# Patient Record
Sex: MALE | Race: BLACK OR AFRICAN AMERICAN | NOT HISPANIC OR LATINO | Employment: UNEMPLOYED | URBAN - METROPOLITAN AREA
[De-identification: names, ages, dates, MRNs, and addresses within clinical notes are randomized per-mention and may not be internally consistent; named-entity substitution may affect disease eponyms.]

---

## 2021-07-26 ENCOUNTER — TELEPHONE (OUTPATIENT)
Dept: FAMILY MEDICINE | Facility: CLINIC | Age: 56
End: 2021-07-26

## 2021-07-26 ENCOUNTER — OFFICE VISIT (OUTPATIENT)
Dept: FAMILY MEDICINE | Facility: CLINIC | Age: 56
End: 2021-07-26
Payer: MEDICARE

## 2021-07-26 VITALS
BODY MASS INDEX: 34.39 KG/M2 | RESPIRATION RATE: 16 BRPM | TEMPERATURE: 97 F | HEART RATE: 60 BPM | OXYGEN SATURATION: 96 % | WEIGHT: 232.19 LBS | DIASTOLIC BLOOD PRESSURE: 98 MMHG | HEIGHT: 69 IN | SYSTOLIC BLOOD PRESSURE: 160 MMHG

## 2021-07-26 DIAGNOSIS — Z13.228 SCREENING FOR ENDOCRINE, NUTRITIONAL, METABOLIC AND IMMUNITY DISORDER: ICD-10-CM

## 2021-07-26 DIAGNOSIS — E78.5 HYPERLIPIDEMIA, UNSPECIFIED HYPERLIPIDEMIA TYPE: ICD-10-CM

## 2021-07-26 DIAGNOSIS — Z89.511 HISTORY OF RIGHT BELOW KNEE AMPUTATION: ICD-10-CM

## 2021-07-26 DIAGNOSIS — Z13.0 SCREENING FOR ENDOCRINE, NUTRITIONAL, METABOLIC AND IMMUNITY DISORDER: ICD-10-CM

## 2021-07-26 DIAGNOSIS — Z13.21 SCREENING FOR ENDOCRINE, NUTRITIONAL, METABOLIC AND IMMUNITY DISORDER: ICD-10-CM

## 2021-07-26 DIAGNOSIS — G54.6 PHANTOM PAIN AFTER AMPUTATION OF LOWER EXTREMITY: ICD-10-CM

## 2021-07-26 DIAGNOSIS — Z79.4 TYPE 2 DIABETES MELLITUS WITH DIABETIC MONONEUROPATHY, WITH LONG-TERM CURRENT USE OF INSULIN: ICD-10-CM

## 2021-07-26 DIAGNOSIS — I10 ESSENTIAL HYPERTENSION: ICD-10-CM

## 2021-07-26 DIAGNOSIS — Z76.89 ENCOUNTER TO ESTABLISH CARE: Primary | ICD-10-CM

## 2021-07-26 DIAGNOSIS — Z11.59 ENCOUNTER FOR SCREENING FOR OTHER VIRAL DISEASES: ICD-10-CM

## 2021-07-26 DIAGNOSIS — D64.9 ANEMIA, UNSPECIFIED TYPE: Primary | ICD-10-CM

## 2021-07-26 DIAGNOSIS — E11.41 TYPE 2 DIABETES MELLITUS WITH DIABETIC MONONEUROPATHY, WITH LONG-TERM CURRENT USE OF INSULIN: ICD-10-CM

## 2021-07-26 DIAGNOSIS — Z00.00 ROUTINE ADULT HEALTH MAINTENANCE: ICD-10-CM

## 2021-07-26 DIAGNOSIS — Z13.29 SCREENING FOR ENDOCRINE, NUTRITIONAL, METABOLIC AND IMMUNITY DISORDER: ICD-10-CM

## 2021-07-26 PROCEDURE — 99204 OFFICE O/P NEW MOD 45 MIN: CPT | Mod: S$GLB,,, | Performed by: OBSTETRICS & GYNECOLOGY

## 2021-07-26 PROCEDURE — 99204 PR OFFICE/OUTPT VISIT, NEW, LEVL IV, 45-59 MIN: ICD-10-PCS | Mod: S$GLB,,, | Performed by: OBSTETRICS & GYNECOLOGY

## 2021-07-26 RX ORDER — GABAPENTIN 600 MG/1
600 TABLET ORAL 3 TIMES DAILY
COMMUNITY
Start: 2021-05-07 | End: 2021-07-26 | Stop reason: SDUPTHER

## 2021-07-26 RX ORDER — NAPROXEN SODIUM 220 MG/1
81 TABLET, FILM COATED ORAL DAILY
Qty: 90 TABLET | Refills: 1 | Status: SHIPPED | OUTPATIENT
Start: 2021-07-26 | End: 2021-10-21 | Stop reason: SDUPTHER

## 2021-07-26 RX ORDER — ATORVASTATIN CALCIUM 80 MG/1
80 TABLET, FILM COATED ORAL NIGHTLY
COMMUNITY
Start: 2021-06-29 | End: 2021-07-26 | Stop reason: SDUPTHER

## 2021-07-26 RX ORDER — SITAGLIPTIN 50 MG/1
50 TABLET, FILM COATED ORAL DAILY
COMMUNITY
Start: 2021-06-29 | End: 2021-07-26 | Stop reason: SDUPTHER

## 2021-07-26 RX ORDER — HYDROCODONE BITARTRATE AND ACETAMINOPHEN 5; 325 MG/1; MG/1
1 TABLET ORAL EVERY 6 HOURS PRN
COMMUNITY
Start: 2021-06-29 | End: 2022-01-28

## 2021-07-26 RX ORDER — INSULIN DETEMIR 100 [IU]/ML
45 INJECTION, SOLUTION SUBCUTANEOUS NIGHTLY
Qty: 13.5 ML | Refills: 3 | Status: SHIPPED | OUTPATIENT
Start: 2021-07-26 | End: 2021-10-21 | Stop reason: SDUPTHER

## 2021-07-26 RX ORDER — NIFEDIPINE 30 MG/1
30 TABLET, FILM COATED, EXTENDED RELEASE ORAL DAILY
Qty: 90 TABLET | Refills: 1 | Status: SHIPPED | OUTPATIENT
Start: 2021-07-26 | End: 2021-10-21

## 2021-07-26 RX ORDER — LISINOPRIL 5 MG/1
5 TABLET ORAL DAILY
Qty: 90 TABLET | Refills: 3 | Status: SHIPPED | OUTPATIENT
Start: 2021-07-26 | End: 2021-10-21 | Stop reason: SDUPTHER

## 2021-07-26 RX ORDER — ATORVASTATIN CALCIUM 80 MG/1
80 TABLET, FILM COATED ORAL NIGHTLY
Qty: 90 TABLET | Refills: 1 | Status: SHIPPED | OUTPATIENT
Start: 2021-07-26 | End: 2021-10-21 | Stop reason: SDUPTHER

## 2021-07-26 RX ORDER — OFLOXACIN 3 MG/ML
1 SOLUTION/ DROPS OPHTHALMIC
COMMUNITY
End: 2021-09-16

## 2021-07-26 RX ORDER — INSULIN DETEMIR 100 [IU]/ML
35 INJECTION, SOLUTION SUBCUTANEOUS
COMMUNITY
Start: 2021-06-30 | End: 2021-07-26 | Stop reason: SDUPTHER

## 2021-07-26 RX ORDER — NIFEDIPINE 30 MG/1
30 TABLET, FILM COATED, EXTENDED RELEASE ORAL DAILY
COMMUNITY
Start: 2021-06-29 | End: 2021-07-26 | Stop reason: SDUPTHER

## 2021-07-26 RX ORDER — SITAGLIPTIN 50 MG/1
50 TABLET, FILM COATED ORAL DAILY
Qty: 90 TABLET | Refills: 1 | Status: SHIPPED | OUTPATIENT
Start: 2021-07-26 | End: 2021-09-16

## 2021-07-26 RX ORDER — GABAPENTIN 600 MG/1
600 TABLET ORAL 3 TIMES DAILY
Qty: 90 TABLET | Refills: 3 | Status: SHIPPED | OUTPATIENT
Start: 2021-07-26 | End: 2021-09-16

## 2021-07-26 RX ORDER — NAPROXEN SODIUM 220 MG/1
81 TABLET, FILM COATED ORAL DAILY
COMMUNITY
End: 2021-07-26 | Stop reason: SDUPTHER

## 2021-07-29 ENCOUNTER — TELEPHONE (OUTPATIENT)
Dept: FAMILY MEDICINE | Facility: CLINIC | Age: 56
End: 2021-07-29

## 2021-07-29 LAB
ABS NRBC COUNT: 0 X 10 3/UL (ref 0–0.01)
ABSOLUTE BASOPHIL: 0.04 X 10 3/UL (ref 0–0.22)
ABSOLUTE EOSINOPHIL: 0.37 X 10 3/UL (ref 0.04–0.54)
ABSOLUTE IMMATURE GRAN: 0.03 X 10 3/UL (ref 0–0.04)
ABSOLUTE LYMPHOCYTE: 1.74 X 10 3/UL (ref 0.86–4.75)
ABSOLUTE MONOCYTE: 0.43 X 10 3/UL (ref 0.22–1.08)
ALBUMIN SERPL-MCNC: 4.2 G/DL (ref 3.5–5.2)
ALBUMIN/GLOB SERPL ELPH: 1.4 {RATIO} (ref 1–2.7)
ALP ISOS SERPL LEV INH-CCNC: 180 U/L (ref 40–130)
ALT (SGPT): 23 U/L (ref 0–41)
ANION GAP SERPL CALC-SCNC: 10 MMOL/L (ref 8–17)
AST SERPL-CCNC: 16 U/L (ref 0–40)
BASOPHILS NFR BLD: 0.5 % (ref 0.2–1.2)
BILIRUBIN, TOTAL: 0.48 MG/DL (ref 0–1.2)
BUN/CREAT SERPL: 19.7 (ref 6–20)
CALCIUM SERPL-MCNC: 9.3 MG/DL (ref 8.6–10.2)
CARBON DIOXIDE, CO2: 25 MMOL/L (ref 22–29)
CHLORIDE: 104 MMOL/L (ref 98–107)
CHOLEST SERPL-MSCNC: 96 MG/DL (ref 100–200)
CREAT SERPL-MCNC: 1.71 MG/DL (ref 0.7–1.2)
EOSINOPHIL NFR BLD: 5.1 % (ref 0.7–7)
GFR ESTIMATION: 41.77
GLOBULIN: 3.1 G/DL (ref 1.5–4.5)
GLUCOSE: 181 MG/DL (ref 74–106)
HCT VFR BLD AUTO: 39.1 % (ref 42–52)
HCV IGG SERPL QL IA: NONREACTIVE
HDLC SERPL-MCNC: 44 MG/DL
HGB BLD-MCNC: 12.2 G/DL (ref 14–18)
HIV 1+2 AB+HIV1 P24 AG SERPL QL IA: NONREACTIVE
IMMATURE GRANULOCYTES: 0.4 % (ref 0–0.5)
IRON BINDING CAPACITY: 332 UG/DL (ref 262–472)
IRON SERPL-MCNC: 47 UG/DL (ref 59–158)
LDL/HDL RATIO: 0.9 (ref 1–3)
LDLC SERPL CALC-MCNC: 37.8 MG/DL (ref 0–100)
LYMPHOCYTES NFR BLD: 23.8 % (ref 19.3–53.1)
MCH RBC QN AUTO: 26.5 PG (ref 27–32)
MCHC RBC AUTO-ENTMCNC: 31.2 G/DL (ref 32–36)
MCV RBC AUTO: 85 FL (ref 80–94)
MONOCYTES NFR BLD: 5.9 % (ref 4.7–12.5)
NEUTROPHILS # BLD AUTO: 4.71 X 10 3/UL (ref 2.15–7.56)
NEUTROPHILS NFR BLD: 64.3 % (ref 34–71.1)
NUCLEATED RED BLOOD CELLS: 0 /100 WBC (ref 0–0.2)
PLATELET # BLD AUTO: 247 X 10 3/UL (ref 135–400)
POTASSIUM: 5 MMOL/L (ref 3.5–5.1)
PROT SNV-MCNC: 7.3 G/DL (ref 6.4–8.3)
RBC # BLD AUTO: 4.6 X 10 6/UL (ref 4.7–6.1)
RDW-SD: 41.1 FL (ref 37–54)
SODIUM: 139 MMOL/L (ref 136–145)
TRIGL SERPL-MCNC: 71 MG/DL (ref 0–150)
TSH SERPL DL<=0.005 MIU/L-ACNC: 1.15 UIU/ML (ref 0.27–4.2)
UIBC SERPL-MCNC: 285 UG/DL (ref 112–346)
UREA NITROGEN (BUN): 33.7 MG/DL (ref 6–20)
WBC # BLD: 7.32 X 10 3/UL (ref 4.3–10.8)

## 2021-08-05 ENCOUNTER — TELEPHONE (OUTPATIENT)
Dept: FAMILY MEDICINE | Facility: CLINIC | Age: 56
End: 2021-08-05

## 2021-08-16 ENCOUNTER — OFFICE VISIT (OUTPATIENT)
Dept: FAMILY MEDICINE | Facility: CLINIC | Age: 56
End: 2021-08-16
Payer: MEDICARE

## 2021-08-16 VITALS
DIASTOLIC BLOOD PRESSURE: 72 MMHG | OXYGEN SATURATION: 98 % | SYSTOLIC BLOOD PRESSURE: 115 MMHG | TEMPERATURE: 99 F | HEART RATE: 66 BPM | BODY MASS INDEX: 34.21 KG/M2 | HEIGHT: 69 IN | RESPIRATION RATE: 18 BRPM | WEIGHT: 231 LBS

## 2021-08-16 DIAGNOSIS — R79.89 ELEVATED SERUM CREATININE: Primary | ICD-10-CM

## 2021-08-16 DIAGNOSIS — D50.9 IRON DEFICIENCY ANEMIA, UNSPECIFIED IRON DEFICIENCY ANEMIA TYPE: ICD-10-CM

## 2021-08-16 DIAGNOSIS — G54.6 PHANTOM PAIN AFTER AMPUTATION OF LOWER EXTREMITY: ICD-10-CM

## 2021-08-16 DIAGNOSIS — M54.50 ACUTE BILATERAL LOW BACK PAIN WITHOUT SCIATICA: ICD-10-CM

## 2021-08-16 DIAGNOSIS — Z79.4 TYPE 2 DIABETES MELLITUS WITH DIABETIC MONONEUROPATHY, WITH LONG-TERM CURRENT USE OF INSULIN: ICD-10-CM

## 2021-08-16 DIAGNOSIS — I10 ESSENTIAL HYPERTENSION: ICD-10-CM

## 2021-08-16 DIAGNOSIS — M15.9 OSTEOARTHRITIS OF MULTIPLE JOINTS, UNSPECIFIED OSTEOARTHRITIS TYPE: ICD-10-CM

## 2021-08-16 DIAGNOSIS — E11.41 TYPE 2 DIABETES MELLITUS WITH DIABETIC MONONEUROPATHY, WITH LONG-TERM CURRENT USE OF INSULIN: ICD-10-CM

## 2021-08-16 LAB
ALBUMIN SERPL-MCNC: 4.1 G/DL (ref 3.5–5.2)
ALBUMIN/GLOB SERPL ELPH: 1.3 {RATIO} (ref 1–2.7)
ALP ISOS SERPL LEV INH-CCNC: 103 U/L (ref 40–130)
ALT (SGPT): 18 U/L (ref 0–41)
ANION GAP SERPL CALC-SCNC: 9 MMOL/L (ref 8–17)
AST SERPL-CCNC: 13 U/L (ref 0–40)
BILIRUBIN, TOTAL: 0.68 MG/DL (ref 0–1.2)
BUN/CREAT SERPL: 17.4 (ref 6–20)
CALCIUM SERPL-MCNC: 9.2 MG/DL (ref 8.6–10.2)
CARBON DIOXIDE, CO2: 26 MMOL/L (ref 22–29)
CHLORIDE: 102 MMOL/L (ref 98–107)
CREAT SERPL-MCNC: 2.13 MG/DL (ref 0.7–1.2)
GFR ESTIMATION: 32.42
GLOBULIN: 3.1 G/DL (ref 1.5–4.5)
GLUCOSE: 139 MG/DL (ref 74–106)
POTASSIUM: 5.4 MMOL/L (ref 3.5–5.1)
PROT SNV-MCNC: 7.2 G/DL (ref 6.4–8.3)
SODIUM: 137 MMOL/L (ref 136–145)
UREA NITROGEN (BUN): 37.1 MG/DL (ref 6–20)

## 2021-08-16 PROCEDURE — 99214 OFFICE O/P EST MOD 30 MIN: CPT | Mod: S$GLB,,, | Performed by: OBSTETRICS & GYNECOLOGY

## 2021-08-16 PROCEDURE — 99214 PR OFFICE/OUTPT VISIT, EST, LEVL IV, 30-39 MIN: ICD-10-PCS | Mod: S$GLB,,, | Performed by: OBSTETRICS & GYNECOLOGY

## 2021-08-16 RX ORDER — TAMSULOSIN HYDROCHLORIDE 0.4 MG/1
1 CAPSULE ORAL DAILY
COMMUNITY
Start: 2021-08-11 | End: 2021-09-16

## 2021-08-16 RX ORDER — FERROUS SULFATE 325(65) MG
325 TABLET ORAL 2 TIMES DAILY
Qty: 60 TABLET | Refills: 6 | Status: SHIPPED | OUTPATIENT
Start: 2021-08-16 | End: 2021-09-15

## 2021-08-16 RX ORDER — DICLOFENAC SODIUM 10 MG/G
2 GEL TOPICAL 4 TIMES DAILY
Qty: 100 G | Refills: 2 | Status: SHIPPED | OUTPATIENT
Start: 2021-08-16 | End: 2022-01-28

## 2021-08-17 ENCOUNTER — TELEPHONE (OUTPATIENT)
Dept: FAMILY MEDICINE | Facility: CLINIC | Age: 56
End: 2021-08-17

## 2021-08-18 ENCOUNTER — TELEPHONE (OUTPATIENT)
Dept: HEMATOLOGY/ONCOLOGY | Facility: CLINIC | Age: 56
End: 2021-08-18

## 2021-09-13 ENCOUNTER — TELEPHONE (OUTPATIENT)
Dept: FAMILY MEDICINE | Facility: CLINIC | Age: 56
End: 2021-09-13

## 2021-09-16 ENCOUNTER — OFFICE VISIT (OUTPATIENT)
Dept: FAMILY MEDICINE | Facility: CLINIC | Age: 56
End: 2021-09-16
Payer: MEDICARE

## 2021-09-16 VITALS
WEIGHT: 236.19 LBS | TEMPERATURE: 98 F | HEART RATE: 98 BPM | BODY MASS INDEX: 34.98 KG/M2 | HEIGHT: 69 IN | OXYGEN SATURATION: 99 % | SYSTOLIC BLOOD PRESSURE: 119 MMHG | DIASTOLIC BLOOD PRESSURE: 70 MMHG | RESPIRATION RATE: 16 BRPM

## 2021-09-16 DIAGNOSIS — Z79.4 TYPE 2 DIABETES MELLITUS WITH DIABETIC MONONEUROPATHY, WITH LONG-TERM CURRENT USE OF INSULIN: ICD-10-CM

## 2021-09-16 DIAGNOSIS — Z01.818 PRE-OPERATIVE EXAM: Primary | ICD-10-CM

## 2021-09-16 DIAGNOSIS — E11.41 TYPE 2 DIABETES MELLITUS WITH DIABETIC MONONEUROPATHY, WITH LONG-TERM CURRENT USE OF INSULIN: ICD-10-CM

## 2021-09-16 LAB
ABS NRBC COUNT: 0 X 10 3/UL (ref 0–0.01)
ABSOLUTE BASOPHIL: 0.05 X 10 3/UL (ref 0–0.22)
ABSOLUTE EOSINOPHIL: 0.27 X 10 3/UL (ref 0.04–0.54)
ABSOLUTE IMMATURE GRAN: 0.03 X 10 3/UL (ref 0–0.04)
ABSOLUTE LYMPHOCYTE: 2.24 X 10 3/UL (ref 0.86–4.75)
ABSOLUTE MONOCYTE: 0.37 X 10 3/UL (ref 0.22–1.08)
ALBUMIN SERPL-MCNC: 3.8 G/DL (ref 3.5–5.2)
ALBUMIN/GLOB SERPL ELPH: 1.4 {RATIO} (ref 1–2.7)
ALP ISOS SERPL LEV INH-CCNC: 124 U/L (ref 40–130)
ALT (SGPT): 13 U/L (ref 0–41)
ANION GAP SERPL CALC-SCNC: 8 MMOL/L (ref 8–17)
AST SERPL-CCNC: 10 U/L (ref 0–40)
BASOPHILS NFR BLD: 0.8 % (ref 0.2–1.2)
BILIRUBIN, TOTAL: 0.38 MG/DL (ref 0–1.2)
BUN/CREAT SERPL: 16 (ref 6–20)
CALCIUM SERPL-MCNC: 8.8 MG/DL (ref 8.6–10.2)
CARBON DIOXIDE, CO2: 26 MMOL/L (ref 22–29)
CHLORIDE: 106 MMOL/L (ref 98–107)
CREAT SERPL-MCNC: 2.26 MG/DL (ref 0.7–1.2)
EOSINOPHIL NFR BLD: 4.1 % (ref 0.7–7)
GFR ESTIMATION: 30.28
GLOBULIN: 2.8 G/DL (ref 1.5–4.5)
GLUCOSE: 223 MG/DL (ref 74–106)
HCT VFR BLD AUTO: 34.5 % (ref 42–52)
HGB BLD-MCNC: 10.4 G/DL (ref 14–18)
IMMATURE GRANULOCYTES: 0.5 % (ref 0–0.5)
LYMPHOCYTES NFR BLD: 34 % (ref 19.3–53.1)
MCH RBC QN AUTO: 25.5 PG (ref 27–32)
MCHC RBC AUTO-ENTMCNC: 30.1 G/DL (ref 32–36)
MCV RBC AUTO: 84.6 FL (ref 80–94)
MONOCYTES NFR BLD: 5.6 % (ref 4.7–12.5)
NEUTROPHILS # BLD AUTO: 3.62 X 10 3/UL (ref 2.15–7.56)
NEUTROPHILS NFR BLD: 55 % (ref 34–71.1)
NUCLEATED RED BLOOD CELLS: 0 /100 WBC (ref 0–0.2)
PLATELET # BLD AUTO: 282 X 10 3/UL (ref 135–400)
POTASSIUM: 5.7 MMOL/L (ref 3.5–5.1)
PROT SNV-MCNC: 6.6 G/DL (ref 6.4–8.3)
RBC # BLD AUTO: 4.08 X 10 6/UL (ref 4.7–6.1)
RDW-SD: 42.5 FL (ref 37–54)
SODIUM: 140 MMOL/L (ref 136–145)
UREA NITROGEN (BUN): 36.2 MG/DL (ref 6–20)
WBC # BLD: 6.58 X 10 3/UL (ref 4.3–10.8)

## 2021-09-16 PROCEDURE — 99214 PR OFFICE/OUTPT VISIT, EST, LEVL IV, 30-39 MIN: ICD-10-PCS | Mod: S$GLB,,, | Performed by: OBSTETRICS & GYNECOLOGY

## 2021-09-16 PROCEDURE — 99214 OFFICE O/P EST MOD 30 MIN: CPT | Mod: S$GLB,,, | Performed by: OBSTETRICS & GYNECOLOGY

## 2021-09-16 RX ORDER — GABAPENTIN 800 MG/1
800 TABLET ORAL 3 TIMES DAILY
Qty: 90 TABLET | Refills: 3 | Status: SHIPPED | OUTPATIENT
Start: 2021-09-16 | End: 2022-05-02

## 2021-09-20 ENCOUNTER — TELEPHONE (OUTPATIENT)
Dept: FAMILY MEDICINE | Facility: CLINIC | Age: 56
End: 2021-09-20

## 2021-10-21 ENCOUNTER — OFFICE VISIT (OUTPATIENT)
Dept: FAMILY MEDICINE | Facility: CLINIC | Age: 56
End: 2021-10-21
Payer: MEDICARE

## 2021-10-21 VITALS
WEIGHT: 228 LBS | RESPIRATION RATE: 16 BRPM | HEIGHT: 69 IN | OXYGEN SATURATION: 98 % | DIASTOLIC BLOOD PRESSURE: 80 MMHG | TEMPERATURE: 97 F | HEART RATE: 94 BPM | BODY MASS INDEX: 33.77 KG/M2 | SYSTOLIC BLOOD PRESSURE: 125 MMHG

## 2021-10-21 DIAGNOSIS — E11.41 TYPE 2 DIABETES MELLITUS WITH DIABETIC MONONEUROPATHY, WITH LONG-TERM CURRENT USE OF INSULIN: ICD-10-CM

## 2021-10-21 DIAGNOSIS — Z79.4 TYPE 2 DIABETES MELLITUS WITH DIABETIC MONONEUROPATHY, WITH LONG-TERM CURRENT USE OF INSULIN: ICD-10-CM

## 2021-10-21 DIAGNOSIS — E78.5 HYPERLIPIDEMIA, UNSPECIFIED HYPERLIPIDEMIA TYPE: ICD-10-CM

## 2021-10-21 DIAGNOSIS — I10 ESSENTIAL HYPERTENSION: ICD-10-CM

## 2021-10-21 PROCEDURE — 99214 PR OFFICE/OUTPT VISIT, EST, LEVL IV, 30-39 MIN: ICD-10-PCS | Mod: S$GLB,,, | Performed by: OBSTETRICS & GYNECOLOGY

## 2021-10-21 PROCEDURE — 99214 OFFICE O/P EST MOD 30 MIN: CPT | Mod: S$GLB,,, | Performed by: OBSTETRICS & GYNECOLOGY

## 2021-10-21 RX ORDER — LISINOPRIL 5 MG/1
5 TABLET ORAL DAILY
Qty: 90 TABLET | Refills: 1 | Status: SHIPPED | OUTPATIENT
Start: 2021-10-21 | End: 2022-01-28 | Stop reason: SDUPTHER

## 2021-10-21 RX ORDER — INSULIN DETEMIR 100 [IU]/ML
45 INJECTION, SOLUTION SUBCUTANEOUS NIGHTLY
Qty: 13.5 ML | Refills: 3 | Status: SHIPPED | OUTPATIENT
Start: 2021-10-21 | End: 2022-01-28 | Stop reason: SDUPTHER

## 2021-10-21 RX ORDER — TRAMADOL HYDROCHLORIDE 50 MG/1
50 TABLET ORAL EVERY 4 HOURS PRN
COMMUNITY
Start: 2021-09-30 | End: 2022-01-28

## 2021-10-21 RX ORDER — NAPROXEN SODIUM 220 MG/1
81 TABLET, FILM COATED ORAL DAILY
Qty: 90 TABLET | Refills: 1 | Status: SHIPPED | OUTPATIENT
Start: 2021-10-21 | End: 2022-05-23 | Stop reason: SDUPTHER

## 2021-10-21 RX ORDER — ATORVASTATIN CALCIUM 80 MG/1
80 TABLET, FILM COATED ORAL NIGHTLY
Qty: 90 TABLET | Refills: 1 | Status: SHIPPED | OUTPATIENT
Start: 2021-10-21 | End: 2022-01-28 | Stop reason: SDUPTHER

## 2021-11-29 ENCOUNTER — PATIENT OUTREACH (OUTPATIENT)
Dept: ADMINISTRATIVE | Facility: HOSPITAL | Age: 56
End: 2021-11-29
Payer: MEDICARE

## 2022-01-28 ENCOUNTER — OFFICE VISIT (OUTPATIENT)
Dept: FAMILY MEDICINE | Facility: CLINIC | Age: 57
End: 2022-01-28
Payer: MEDICARE

## 2022-01-28 VITALS
RESPIRATION RATE: 16 BRPM | DIASTOLIC BLOOD PRESSURE: 82 MMHG | OXYGEN SATURATION: 99 % | BODY MASS INDEX: 32.29 KG/M2 | HEART RATE: 50 BPM | WEIGHT: 218 LBS | HEIGHT: 69 IN | SYSTOLIC BLOOD PRESSURE: 144 MMHG

## 2022-01-28 DIAGNOSIS — E78.5 HYPERLIPIDEMIA, UNSPECIFIED HYPERLIPIDEMIA TYPE: ICD-10-CM

## 2022-01-28 DIAGNOSIS — J30.2 SEASONAL ALLERGIC RHINITIS, UNSPECIFIED TRIGGER: ICD-10-CM

## 2022-01-28 DIAGNOSIS — I10 ESSENTIAL HYPERTENSION: ICD-10-CM

## 2022-01-28 DIAGNOSIS — R03.0 ELEVATED BLOOD PRESSURE READING: ICD-10-CM

## 2022-01-28 DIAGNOSIS — Z79.4 TYPE 2 DIABETES MELLITUS WITH DIABETIC MONONEUROPATHY, WITH LONG-TERM CURRENT USE OF INSULIN: Primary | ICD-10-CM

## 2022-01-28 DIAGNOSIS — E11.41 TYPE 2 DIABETES MELLITUS WITH DIABETIC MONONEUROPATHY, WITH LONG-TERM CURRENT USE OF INSULIN: Primary | ICD-10-CM

## 2022-01-28 PROCEDURE — 99214 PR OFFICE/OUTPT VISIT, EST, LEVL IV, 30-39 MIN: ICD-10-PCS | Mod: S$GLB,,, | Performed by: OBSTETRICS & GYNECOLOGY

## 2022-01-28 PROCEDURE — 99214 OFFICE O/P EST MOD 30 MIN: CPT | Mod: S$GLB,,, | Performed by: OBSTETRICS & GYNECOLOGY

## 2022-01-28 RX ORDER — LISINOPRIL 5 MG/1
5 TABLET ORAL DAILY
Qty: 90 TABLET | Refills: 1 | Status: SHIPPED | OUTPATIENT
Start: 2022-01-28 | End: 2022-02-04

## 2022-01-28 RX ORDER — SEMAGLUTIDE 1.34 MG/ML
0.25 INJECTION, SOLUTION SUBCUTANEOUS
Qty: 1 PEN | Refills: 0 | Status: SHIPPED | OUTPATIENT
Start: 2022-01-28 | End: 2022-02-04 | Stop reason: SDUPTHER

## 2022-01-28 RX ORDER — CETIRIZINE HYDROCHLORIDE 10 MG/1
10 TABLET ORAL DAILY
Qty: 30 TABLET | Refills: 1 | Status: SHIPPED | OUTPATIENT
Start: 2022-01-28 | End: 2022-02-04 | Stop reason: SDUPTHER

## 2022-01-28 RX ORDER — ATORVASTATIN CALCIUM 80 MG/1
80 TABLET, FILM COATED ORAL NIGHTLY
Qty: 90 TABLET | Refills: 1 | Status: SHIPPED | OUTPATIENT
Start: 2022-01-28 | End: 2022-02-28

## 2022-01-28 RX ORDER — INSULIN DETEMIR 100 [IU]/ML
45 INJECTION, SOLUTION SUBCUTANEOUS NIGHTLY
Qty: 13.5 ML | Refills: 3 | Status: SHIPPED | OUTPATIENT
Start: 2022-01-28 | End: 2022-05-23 | Stop reason: SDUPTHER

## 2022-01-28 NOTE — PROGRESS NOTES
Subjective:   Patient ID: Tk Padron is a 56 y.o. male who presents for evaluation today.    Chief Complaint:  Follow-up (3 month. Patient states that he has started to have nosebleeds when he blows his nose. )    History of Present Illness  HPI   Diabetes Mellitus  Patient presents for follow up of diabetes.   Current sx: asymptomatic  Patient denies: foot ulcerations, hyperglycemia, hypoglycemia , increased appetite, nausea, paresthesia of the feet, polydipsia, polyuria, visual disturbances, vomiting and weight loss.  Glucose monitoring: does not perform self glucose checks  Medication compliance: has been compliant with the medicaiton regimen  Dietary compliance: Self-reports he is compliant most of the time with following an ADA diet.  Exercise compliance: Rates his exercise level as intermittently.    Problem-focused Diabetes health goals:     Screening / Status    Goal    Meeting Goal?     HgbA1C  8.9 (07/21/2021) Annually  Less than 8% No     Lipid Profile Last done:  : 07/26/2021 Annually Yes   Lab Results   Component Value Date    LDLCALC 37.8 07/26/2021    Annually   Less than 100 mg/dl Yes    Nephropathy Screening  No results found for: LABMICR  No results found for: PROTEINUA Annually No    Blood Pressure Control      BP: (!) 144/82 (01/28/22 1000) Less than 140/90 No    Dilated retinal exam  Date of last exam:       Most Recent Eye Exam Date: Not Found Annually Yes    Statin: Taking  Yes   ACE/ARB: Taking  Yes    Foot exam  Date of last exam:       : 09/16/2021 Annually Yes     No vision changes, polyuria, polydipsia, hyper/hypoglycemic episodes, SOB, chest pain.    Hyperlipidemia  Pt with a PMH of dyslipidemia.   Duration: chronic  Current treatment regimen: atorvastatin 80 mg   Compliance: High compliance  Complications: no known coronary artery disease, no known cerebral vascular disease, no known peripheral artery disease  ASCVD:The ASCVD Risk score (Arely JORGE Jr., et al., 2013) failed to  calculate for the following reasons:    The valid total cholesterol range is 130 to 320 mg/dL     The patient does use medications that may worsen dyslipidemias (corticosteroids, progestins, anabolic steroids, diuretics, beta-blockers, amiodarone, cyclosporine, olanzapine).   The patient exercises infrequently.    Cardiac Risk Factors  Age > 45-male, > 55-female:  YES  +1   Smoking:   NO   Sig. family hx of CHD*:  NO   Hypertension:   YES  +1   Diabetes:   YES  +1   HDL < 35:   NO   HDL > 59:   NO   Total: 3   *- Sig. family h/o CHD per NCEP = MI or sudden death at < 55 y.o. in father or other 1st-degree male relative, or <64yo in mother or other 1st-degree female relative    Lab Review  Lab Results   Component Value Date    CHOL 96 (L) 07/26/2021    HDL 44 (L) 07/26/2021     Hypertension  Patient is here for follow-up of hypertension.   Home blood pressure readings: not doing.    Diet type, salt intake: general   Use of agents associated with hypertension: none  (anticholinergics, amphetamines, anorectics, corticosteroids, decongestants, estrogens or OCP, NSAIDs, thyroid hormones, topical mineralocorticoids)  Associated signs and symptoms: none.   Patient specifically denies: blurred vision, chest pain, dyspnea, headache, neck aches, orthopnea, palpitations, paroxysmal nocturnal dyspnea, peripheral edema, pulsating in the ears and tiredness/fatigue.   Medication compliance: taking as prescribed.  History of target organ damage: chronic kidney disease.  Cardiovascular risk factors are diabetes mellitus, dyslipidemia, hypertension, male gender and obesity (BMI >= 30 kg/m2).   Family history is negative for hypertension, diabetes, or cardiovascular disease.    FAMILY HISTORY  History reviewed. No pertinent family history.  SOCIAL HISTORY  Social History     Tobacco Use   Smoking Status Never Smoker   Smokeless Tobacco Never Used   ,   Social History     Substance and Sexual Activity   Alcohol Use Never      MEDICATIONS  Outpatient Medications Marked as Taking for the 1/28/22 encounter (Office Visit) with Augustina Johansen NP   Medication Sig Dispense Refill    aspirin 81 MG Chew Take 1 tablet (81 mg total) by mouth once daily. 90 tablet 1    gabapentin (NEURONTIN) 800 MG tablet Take 1 tablet (800 mg total) by mouth 3 (three) times daily. 90 tablet 3    [DISCONTINUED] atorvastatin (LIPITOR) 80 MG tablet Take 1 tablet (80 mg total) by mouth nightly. 90 tablet 1    [DISCONTINUED] LEVEMIR FLEXTOUCH U-100 INSULN 100 unit/mL (3 mL) InPn pen Inject 45 Units into the skin every evening. 13.5 mL 3    [DISCONTINUED] lisinopriL (PRINIVIL,ZESTRIL) 5 MG tablet Take 1 tablet (5 mg total) by mouth once daily. 90 tablet 1    [DISCONTINUED] SITagliptin (JANUVIA) 100 MG Tab Take 1 tablet (100 mg total) by mouth once daily. 90 tablet 1     Review of Systems   Review of Systems   Constitutional: Negative for activity change, appetite change, fever and unexpected weight change.   HENT: Negative for dental problem, hearing loss, sneezing, sore throat, trouble swallowing and voice change.    Eyes: Negative for visual disturbance.   Respiratory: Negative for cough, choking, chest tightness and shortness of breath.    Cardiovascular: Negative for chest pain, palpitations, leg swelling and claudication.   Gastrointestinal: Negative for abdominal pain, blood in stool, change in bowel habit, nausea, vomiting, fecal incontinence and change in bowel habit.   Endocrine: Negative for polydipsia, polyphagia and polyuria.   Genitourinary: Negative for decreased urine volume, dysuria and hematuria.   Musculoskeletal: Negative for gait problem, neck pain and neck stiffness.   Integumentary:  Negative for color change, pallor, rash, wound and mole/lesion.   Allergic/Immunologic: Negative for frequent infections.   Neurological: Negative for dizziness, vertigo, seizures, syncope, speech difficulty, disturbances in coordination and coordination  "difficulties.   Hematological: Negative for adenopathy. Does not bruise/bleed easily.   Psychiatric/Behavioral: Negative for behavioral problems, confusion, hallucinations, self-injury, sleep disturbance and suicidal ideas.         Objective:    VITALS  BP Readings from Last 1 Encounters:   01/28/22 (!) 144/82   Weight: 98.9 kg (218 lb)   Height: 5' 9" (175.3 cm)   BMI Readings from Last 1 Encounters:   01/28/22 32.19 kg/m²       Physical Exam  Vitals and nursing note reviewed.   Constitutional:       General: He is not in acute distress.     Appearance: Normal appearance. He is not ill-appearing, toxic-appearing or diaphoretic.   HENT:      Head: Normocephalic and atraumatic.      Right Ear: External ear normal.      Left Ear: External ear normal.      Nose: Nose normal.   Eyes:      General:         Right eye: No discharge.         Left eye: No discharge.   Neck:      Vascular: No carotid bruit.   Cardiovascular:      Rate and Rhythm: Normal rate and regular rhythm.      Heart sounds: Normal heart sounds. No murmur heard.  No friction rub. No gallop.    Pulmonary:      Effort: Pulmonary effort is normal. No respiratory distress.      Breath sounds: Normal breath sounds. No stridor. No wheezing, rhonchi or rales.   Chest:      Chest wall: No tenderness.   Musculoskeletal:         General: Normal range of motion.      Cervical back: Normal range of motion and neck supple.      Right lower leg: No edema.      Left lower leg: No edema.   Skin:     General: Skin is warm and dry.      Coloration: Skin is not jaundiced or pale.   Neurological:      General: No focal deficit present.      Mental Status: He is alert and oriented to person, place, and time.      Gait: Gait normal.   Psychiatric:         Mood and Affect: Mood normal.         Behavior: Behavior normal.         Thought Content: Thought content normal.         Judgment: Judgment normal.         Assessment:      1. Type 2 diabetes mellitus with diabetic " mononeuropathy, with long-term current use of insulin    2. Hyperlipidemia, unspecified hyperlipidemia type    3. Elevated blood pressure reading    4. Essential hypertension    5. Seasonal allergic rhinitis, unspecified trigger       Plan:   Type 2 diabetes mellitus with diabetic mononeuropathy, with long-term current use of insulin  -     semaglutide (OZEMPIC) 0.25 mg or 0.5 mg(2 mg/1.5 mL) pen injector; Inject 0.25 mg into the skin every 7 days. for 7 days  Dispense: 1 pen; Refill: 0  -     lisinopriL (PRINIVIL,ZESTRIL) 5 MG tablet; Take 1 tablet (5 mg total) by mouth once daily.  Dispense: 90 tablet; Refill: 1  -     LEVEMIR FLEXTOUCH U-100 INSULN 100 unit/mL (3 mL) InPn pen; Inject 45 Units into the skin every evening.  Dispense: 13.5 mL; Refill: 3  -     Hemoglobin A1C  A1c 9.2% today   1.  Rx changes: Discontinue Januvia, begin Ozempic.  2.  Education: Reviewed ABCs of diabetes management (respective goals in parentheses):  A1C (<7), blood pressure (<130/80), and cholesterol (LDL <100).  3.  Compliance at present is estimated to be good. Efforts to improve compliance (if necessary) will be directed at increased exercise.  4. Follow up: 3 months  Pt states they are familiar with the signs and symptoms of hypoglycemia and how to treat.    Hyperlipidemia, unspecified hyperlipidemia type  -     atorvastatin (LIPITOR) 80 MG tablet; Take 1 tablet (80 mg total) by mouth nightly.  Dispense: 90 tablet; Refill: 1  Refills provided  Risks of elevated cholesterol include plaque formation, HTN, increased risk for stroke and MI. Emphasized need for low fat/cholesterol diet. Benefits of regular exercise routine consisting of 30 minutes of moderate activity 5x/weekly & adding an over-the-counter fish oil daily.     Elevated blood pressure reading  RTC 1 week for reassessment  If still increased at that time will plan to increase lisinopril     Essential hypertension  -     lisinopriL (PRINIVIL,ZESTRIL) 5 MG tablet; Take 1  tablet (5 mg total) by mouth once daily.  Dispense: 90 tablet; Refill: 1  Advised exercise and low salt diet high in vegetables, whole grains and low in meat/saturated fats. Reviewed importance of taking medication(s) daily. Side effects of medications reviewed. Monitor BP daily at home. Keep a log of values & bring to next appointment. Report any abnormalities to provider. Report to nearest ER with chest pain, shortness of breath, markedly increased BP, dyspnea, headache, visual disturbance, etc.      Seasonal allergic rhinitis, unspecified trigger  -     cetirizine (ZYRTEC) 10 MG tablet; Take 1 tablet (10 mg total) by mouth once daily.  Dispense: 30 tablet; Refill: 1    Patient instructed to contact the clinic should any questions or concerns arise prior to next office visit. Risks, benefits, and alternatives discussed with patient. Patient verbalized understanding of discussed plan of care. Asked patient if any further questions, answered no. Follow up as discussed or sooner PRN.

## 2022-02-04 ENCOUNTER — OFFICE VISIT (OUTPATIENT)
Dept: FAMILY MEDICINE | Facility: CLINIC | Age: 57
End: 2022-02-04
Payer: MEDICARE

## 2022-02-04 VITALS
HEIGHT: 69 IN | SYSTOLIC BLOOD PRESSURE: 146 MMHG | OXYGEN SATURATION: 98 % | TEMPERATURE: 98 F | WEIGHT: 222.81 LBS | DIASTOLIC BLOOD PRESSURE: 86 MMHG | RESPIRATION RATE: 16 BRPM | BODY MASS INDEX: 33 KG/M2 | HEART RATE: 96 BPM

## 2022-02-04 DIAGNOSIS — J30.2 SEASONAL ALLERGIC RHINITIS, UNSPECIFIED TRIGGER: ICD-10-CM

## 2022-02-04 DIAGNOSIS — Z79.4 TYPE 2 DIABETES MELLITUS WITH DIABETIC MONONEUROPATHY, WITH LONG-TERM CURRENT USE OF INSULIN: ICD-10-CM

## 2022-02-04 DIAGNOSIS — E11.41 TYPE 2 DIABETES MELLITUS WITH DIABETIC MONONEUROPATHY, WITH LONG-TERM CURRENT USE OF INSULIN: ICD-10-CM

## 2022-02-04 DIAGNOSIS — M15.9 OSTEOARTHRITIS OF MULTIPLE JOINTS, UNSPECIFIED OSTEOARTHRITIS TYPE: Primary | ICD-10-CM

## 2022-02-04 DIAGNOSIS — I10 ESSENTIAL HYPERTENSION: ICD-10-CM

## 2022-02-04 PROCEDURE — 99213 OFFICE O/P EST LOW 20 MIN: CPT | Mod: S$GLB,,, | Performed by: OBSTETRICS & GYNECOLOGY

## 2022-02-04 PROCEDURE — 99213 PR OFFICE/OUTPT VISIT, EST, LEVL III, 20-29 MIN: ICD-10-PCS | Mod: S$GLB,,, | Performed by: OBSTETRICS & GYNECOLOGY

## 2022-02-04 RX ORDER — LISINOPRIL 10 MG/1
10 TABLET ORAL DAILY
Qty: 30 TABLET | Refills: 3 | Status: SHIPPED | OUTPATIENT
Start: 2022-02-04 | End: 2022-05-23 | Stop reason: SDUPTHER

## 2022-02-04 RX ORDER — SEMAGLUTIDE 1.34 MG/ML
0.25 INJECTION, SOLUTION SUBCUTANEOUS
Qty: 1 PEN | Refills: 0 | Status: SHIPPED | OUTPATIENT
Start: 2022-02-04 | End: 2022-02-04

## 2022-02-04 RX ORDER — DICLOFENAC SODIUM 10 MG/G
2 GEL TOPICAL 4 TIMES DAILY
Qty: 50 G | Refills: 6 | Status: SHIPPED | OUTPATIENT
Start: 2022-02-04 | End: 2022-05-23 | Stop reason: SDUPTHER

## 2022-02-04 RX ORDER — DICLOFENAC SODIUM 10 MG/G
2 GEL TOPICAL 4 TIMES DAILY
Qty: 50 G | Refills: 6 | Status: SHIPPED | OUTPATIENT
Start: 2022-02-04 | End: 2022-02-04

## 2022-02-04 RX ORDER — CETIRIZINE HYDROCHLORIDE 10 MG/1
10 TABLET ORAL DAILY
Qty: 30 TABLET | Refills: 1 | Status: SHIPPED | OUTPATIENT
Start: 2022-02-04 | End: 2022-02-04

## 2022-02-04 RX ORDER — SEMAGLUTIDE 1.34 MG/ML
0.25 INJECTION, SOLUTION SUBCUTANEOUS
Qty: 1 PEN | Refills: 0 | Status: SHIPPED | OUTPATIENT
Start: 2022-02-04 | End: 2022-02-11

## 2022-02-04 RX ORDER — LISINOPRIL 10 MG/1
10 TABLET ORAL DAILY
Qty: 30 TABLET | Refills: 3 | Status: SHIPPED | OUTPATIENT
Start: 2022-02-04 | End: 2022-02-04

## 2022-02-04 RX ORDER — CETIRIZINE HYDROCHLORIDE 10 MG/1
10 TABLET ORAL DAILY
Qty: 30 TABLET | Refills: 1 | Status: SHIPPED | OUTPATIENT
Start: 2022-02-04 | End: 2022-05-23 | Stop reason: SDUPTHER

## 2022-02-04 NOTE — PROGRESS NOTES
Subjective:   Patient ID: Tk Padron is a 56 y.o. male who presents for evaluation today.    Chief Complaint:  Follow-up (Pt is here for a follow up on his bp. Pt is also request more of the cream for arthritis pain)      History of Present Illness  HPI   Hypertension  Patient presents to the clinic today for a blood pressure follow up. At the last office visit, an elevated BP reading was noted.  Home blood pressure readings: not doing.    Diet type, salt intake: general   Use of agents associated with hypertension: none  (anticholinergics, amphetamines, anorectics, corticosteroids, decongestants, estrogens or OCP, NSAIDs, thyroid hormones, topical mineralocorticoids)  Associated signs and symptoms: none.   Patient specifically denies: blurred vision, chest pain, dyspnea, headache, neck aches, orthopnea, palpitations, paroxysmal nocturnal dyspnea, peripheral edema, pulsating in the ears and tiredness/fatigue.   Medication compliance: taking as prescribed.    FAMILY HISTORY  No family history on file.  SOCIAL HISTORY  Social History     Tobacco Use   Smoking Status Never Smoker   Smokeless Tobacco Never Used   ,   Social History     Substance and Sexual Activity   Alcohol Use Never     MEDICATIONS  Outpatient Medications Marked as Taking for the 2/4/22 encounter (Office Visit) with Augustina Johansen NP   Medication Sig Dispense Refill    aspirin 81 MG Chew Take 1 tablet (81 mg total) by mouth once daily. 90 tablet 1    atorvastatin (LIPITOR) 80 MG tablet Take 1 tablet (80 mg total) by mouth nightly. 90 tablet 1    gabapentin (NEURONTIN) 800 MG tablet Take 1 tablet (800 mg total) by mouth 3 (three) times daily. 90 tablet 3    LEVEMIR FLEXTOUCH U-100 INSULN 100 unit/mL (3 mL) InPn pen Inject 45 Units into the skin every evening. 13.5 mL 3    [DISCONTINUED] cetirizine (ZYRTEC) 10 MG tablet Take 1 tablet (10 mg total) by mouth once daily. 30 tablet 1    [DISCONTINUED] lisinopriL (PRINIVIL,ZESTRIL) 5 MG tablet  "Take 1 tablet (5 mg total) by mouth once daily. 90 tablet 1    [DISCONTINUED] semaglutide (OZEMPIC) 0.25 mg or 0.5 mg(2 mg/1.5 mL) pen injector Inject 0.25 mg into the skin every 7 days. for 7 days 1 pen 0     Review of Systems   Review of Systems   Constitutional: Negative for activity change, appetite change, fever and unexpected weight change.   HENT: Negative for dental problem, hearing loss, sneezing, sore throat, trouble swallowing and voice change.    Eyes: Negative for visual disturbance.   Respiratory: Negative for cough, choking, chest tightness and shortness of breath.    Cardiovascular: Negative for chest pain, palpitations, leg swelling and claudication.   Gastrointestinal: Negative for abdominal pain, blood in stool, change in bowel habit, nausea, vomiting, fecal incontinence and change in bowel habit.   Endocrine: Negative for polydipsia, polyphagia and polyuria.   Genitourinary: Negative for decreased urine volume, dysuria and hematuria.   Musculoskeletal: Negative for gait problem, neck pain and neck stiffness.   Integumentary:  Negative for color change, pallor, rash, wound and mole/lesion.   Allergic/Immunologic: Negative for frequent infections.   Neurological: Negative for dizziness, vertigo, seizures, syncope, speech difficulty, disturbances in coordination and coordination difficulties.   Hematological: Negative for adenopathy. Does not bruise/bleed easily.   Psychiatric/Behavioral: Negative for behavioral problems, confusion, hallucinations, self-injury, sleep disturbance and suicidal ideas.         Objective:    VITALS  BP Readings from Last 1 Encounters:   02/04/22 (!) 146/86   Weight: 101.1 kg (222 lb 12.8 oz)   Height: 5' 9" (175.3 cm)   BMI Readings from Last 1 Encounters:   02/04/22 32.90 kg/m²       Physical Exam  Vitals and nursing note reviewed.   Constitutional:       General: He is not in acute distress.     Appearance: Normal appearance. He is not ill-appearing, toxic-appearing " or diaphoretic.   HENT:      Head: Normocephalic and atraumatic.      Right Ear: External ear normal.      Left Ear: External ear normal.      Nose: Nose normal.   Eyes:      General:         Right eye: No discharge.         Left eye: No discharge.   Neck:      Vascular: No carotid bruit.   Cardiovascular:      Rate and Rhythm: Normal rate and regular rhythm.      Heart sounds: Normal heart sounds. No murmur heard.  No friction rub. No gallop.    Pulmonary:      Effort: Pulmonary effort is normal. No respiratory distress.      Breath sounds: Normal breath sounds. No stridor. No wheezing, rhonchi or rales.   Chest:      Chest wall: No tenderness.   Musculoskeletal:         General: Normal range of motion.      Cervical back: Normal range of motion and neck supple.      Right lower leg: No edema.      Left lower leg: No edema.   Skin:     General: Skin is warm and dry.      Coloration: Skin is not jaundiced or pale.   Neurological:      General: No focal deficit present.      Mental Status: He is alert and oriented to person, place, and time.      Gait: Gait normal.   Psychiatric:         Mood and Affect: Mood normal.         Behavior: Behavior normal.         Thought Content: Thought content normal.         Judgment: Judgment normal.         Assessment:      1. Osteoarthritis of multiple joints, unspecified osteoarthritis type    2. Type 2 diabetes mellitus with diabetic mononeuropathy, with long-term current use of insulin    3. Seasonal allergic rhinitis, unspecified trigger    4. Essential hypertension       Plan:   Osteoarthritis of multiple joints, unspecified osteoarthritis type  -     Discontinue: diclofenac sodium (ARTHRITIS PAIN, DICLOFENAC,) 1 % Gel; Apply 2 g topically 4 (four) times daily.  Dispense: 50 g; Refill: 6  -     diclofenac sodium (ARTHRITIS PAIN, DICLOFENAC,) 1 % Gel; Apply 2 g topically 4 (four) times daily.  Dispense: 50 g; Refill: 6    Type 2 diabetes mellitus with diabetic mononeuropathy,  with long-term current use of insulin  -     Discontinue: semaglutide (OZEMPIC) 0.25 mg or 0.5 mg(2 mg/1.5 mL) pen injector; Inject 0.25 mg into the skin every 7 days. for 7 days  Dispense: 1 pen; Refill: 0  -     Discontinue: lisinopriL 10 MG tablet; Take 1 tablet (10 mg total) by mouth once daily.  Dispense: 30 tablet; Refill: 3  -     lisinopriL 10 MG tablet; Take 1 tablet (10 mg total) by mouth once daily.  Dispense: 30 tablet; Refill: 3  -     semaglutide (OZEMPIC) 0.25 mg or 0.5 mg(2 mg/1.5 mL) pen injector; Inject 0.25 mg into the skin every 7 days. for 7 days  Dispense: 1 pen; Refill: 0    Seasonal allergic rhinitis, unspecified trigger  -     Discontinue: cetirizine (ZYRTEC) 10 MG tablet; Take 1 tablet (10 mg total) by mouth once daily.  Dispense: 30 tablet; Refill: 1  -     cetirizine (ZYRTEC) 10 MG tablet; Take 1 tablet (10 mg total) by mouth once daily.  Dispense: 30 tablet; Refill: 1    Essential hypertension  -     Discontinue: lisinopriL 10 MG tablet; Take 1 tablet (10 mg total) by mouth once daily.  Dispense: 30 tablet; Refill: 3  -     lisinopriL 10 MG tablet; Take 1 tablet (10 mg total) by mouth once daily.  Dispense: 30 tablet; Refill: 3    Increase lisinopril to 10 mg. Advised exercise and low salt diet high in vegetables, whole grains and low in meat/saturated fats. Reviewed importance of taking medication(s) daily. Side effects of medications reviewed. Monitor BP daily at home. Keep a log of values & bring to next appointment. Report any abnormalities to provider. Report to nearest ER with chest pain, shortness of breath, markedly increased BP, dyspnea, headache, visual disturbance, etc.  F/u 4 weeks.     Patient instructed to contact the clinic should any questions or concerns arise prior to next office visit. Risks, benefits, and alternatives discussed with patient. Patient verbalized understanding of discussed plan of care. Asked patient if any further questions, answered no. Follow up as  discussed or sooner PRN.

## 2022-05-19 ENCOUNTER — TELEPHONE (OUTPATIENT)
Dept: FAMILY MEDICINE | Facility: CLINIC | Age: 57
End: 2022-05-19
Payer: MEDICARE

## 2022-05-19 NOTE — TELEPHONE ENCOUNTER
----- Message from Augustina Johansen NP sent at 5/19/2022  8:36 AM CDT -----  Regarding: please call   Patient due for repeat labs and office visit

## 2022-05-23 ENCOUNTER — TELEPHONE (OUTPATIENT)
Dept: FAMILY MEDICINE | Facility: CLINIC | Age: 57
End: 2022-05-23

## 2022-05-23 ENCOUNTER — OFFICE VISIT (OUTPATIENT)
Dept: FAMILY MEDICINE | Facility: CLINIC | Age: 57
End: 2022-05-23
Payer: MEDICARE

## 2022-05-23 VITALS
TEMPERATURE: 97 F | DIASTOLIC BLOOD PRESSURE: 74 MMHG | BODY MASS INDEX: 31.55 KG/M2 | OXYGEN SATURATION: 98 % | SYSTOLIC BLOOD PRESSURE: 116 MMHG | WEIGHT: 213 LBS | HEART RATE: 62 BPM | HEIGHT: 69 IN | RESPIRATION RATE: 16 BRPM

## 2022-05-23 DIAGNOSIS — E11.41 TYPE 2 DIABETES MELLITUS WITH DIABETIC MONONEUROPATHY, WITH LONG-TERM CURRENT USE OF INSULIN: Primary | ICD-10-CM

## 2022-05-23 DIAGNOSIS — I10 ESSENTIAL HYPERTENSION: ICD-10-CM

## 2022-05-23 DIAGNOSIS — J30.2 SEASONAL ALLERGIC RHINITIS, UNSPECIFIED TRIGGER: ICD-10-CM

## 2022-05-23 DIAGNOSIS — M15.9 OSTEOARTHRITIS OF MULTIPLE JOINTS, UNSPECIFIED OSTEOARTHRITIS TYPE: ICD-10-CM

## 2022-05-23 DIAGNOSIS — Z79.4 TYPE 2 DIABETES MELLITUS WITH DIABETIC MONONEUROPATHY, WITH LONG-TERM CURRENT USE OF INSULIN: Primary | ICD-10-CM

## 2022-05-23 DIAGNOSIS — Z12.11 COLON CANCER SCREENING: ICD-10-CM

## 2022-05-23 DIAGNOSIS — E78.5 HYPERLIPIDEMIA, UNSPECIFIED HYPERLIPIDEMIA TYPE: ICD-10-CM

## 2022-05-23 LAB
A1C: NORMAL
ABS NRBC COUNT: 0 X 10 3/UL (ref 0–0.01)
ABSOLUTE BASOPHIL: 0.03 X 10 3/UL (ref 0–0.22)
ABSOLUTE EOSINOPHIL: 0.28 X 10 3/UL (ref 0.04–0.54)
ABSOLUTE IMMATURE GRAN: 0.04 X 10 3/UL (ref 0–0.04)
ABSOLUTE LYMPHOCYTE: 2.24 X 10 3/UL (ref 0.86–4.75)
ABSOLUTE MONOCYTE: 0.34 X 10 3/UL (ref 0.22–1.08)
ALBUMIN SERPL-MCNC: 4 G/DL (ref 3.5–5.2)
ALBUMIN/GLOB SERPL ELPH: 1.4 {RATIO} (ref 1–2.7)
ALP ISOS SERPL LEV INH-CCNC: 116 U/L (ref 40–130)
ALT (SGPT): 12 U/L (ref 0–41)
ANION GAP SERPL CALC-SCNC: 9 MMOL/L (ref 8–17)
AST SERPL-CCNC: 14 U/L (ref 0–40)
BASOPHILS NFR BLD: 0.4 % (ref 0.2–1.2)
BILIRUBIN, TOTAL: 0.41 MG/DL (ref 0–1.2)
BUN/CREAT SERPL: 19.1 (ref 6–20)
CALCIUM SERPL-MCNC: 9.2 MG/DL (ref 8.6–10.2)
CARBON DIOXIDE, CO2: 27 MMOL/L (ref 22–29)
CHLORIDE: 103 MMOL/L (ref 98–107)
CHOLEST SERPL-MSCNC: 169 MG/DL (ref 100–200)
CREAT SERPL-MCNC: 1.53 MG/DL (ref 0.7–1.2)
CREATININE RANDOM URINE: 124 MG/DL (ref 39–259)
EOSINOPHIL NFR BLD: 4 % (ref 0.7–7)
ESTIMATED AVERAGE GLUCOSE: 189 MG/DL
GFR ESTIMATION: 47.32
GLOBULIN: 2.8 G/DL (ref 1.5–4.5)
GLUCOSE: 201 MG/DL (ref 74–106)
HBA1C MFR BLD: 8.2 % (ref 4–6)
HCT VFR BLD AUTO: 39.3 % (ref 42–52)
HDLC SERPL-MCNC: 46 MG/DL
HGB BLD-MCNC: 12.3 G/DL (ref 14–18)
IMMATURE GRANULOCYTES: 0.6 % (ref 0–0.5)
LDL/HDL RATIO: 2.2 (ref 1–3)
LDLC SERPL CALC-MCNC: 99.4 MG/DL (ref 0–100)
LYMPHOCYTES NFR BLD: 32.3 % (ref 19.3–53.1)
MCH RBC QN AUTO: 26.7 PG (ref 27–32)
MCHC RBC AUTO-ENTMCNC: 31.3 G/DL (ref 32–36)
MCV RBC AUTO: 85.4 FL (ref 80–94)
MICROALBUMIN QUANT: 4.8 MG/DL (ref 0–2)
MICROALBUMIN/CREATININE RATIO: 38.71 UG/MG (ref 0–30)
MONOCYTES NFR BLD: 4.9 % (ref 4.7–12.5)
NEUTROPHILS # BLD AUTO: 4.01 X 10 3/UL (ref 2.15–7.56)
NEUTROPHILS NFR BLD: 57.8 % (ref 34–71.1)
NUCLEATED RED BLOOD CELLS: 0 /100 WBC (ref 0–0.2)
PLATELET # BLD AUTO: 231 X 10 3/UL (ref 135–400)
POTASSIUM: 5.1 MMOL/L (ref 3.5–5.1)
PROT SNV-MCNC: 6.8 G/DL (ref 6.4–8.3)
RBC # BLD AUTO: 4.6 X 10 6/UL (ref 4.7–6.1)
RDW-SD: 44.8 FL (ref 37–54)
SODIUM: 139 MMOL/L (ref 136–145)
TRIGL SERPL-MCNC: 118 MG/DL (ref 0–150)
TSH SERPL DL<=0.005 MIU/L-ACNC: 0.84 UIU/ML (ref 0.27–4.2)
UREA NITROGEN (BUN): 29.2 MG/DL (ref 6–20)
WBC # BLD: 6.94 X 10 3/UL (ref 4.3–10.8)

## 2022-05-23 PROCEDURE — 99214 PR OFFICE/OUTPT VISIT, EST, LEVL IV, 30-39 MIN: ICD-10-PCS | Mod: S$GLB,,, | Performed by: OBSTETRICS & GYNECOLOGY

## 2022-05-23 PROCEDURE — 83036 HEMOGLOBIN A1C: ICD-10-PCS | Mod: QW,,, | Performed by: OBSTETRICS & GYNECOLOGY

## 2022-05-23 PROCEDURE — 99214 OFFICE O/P EST MOD 30 MIN: CPT | Mod: S$GLB,,, | Performed by: OBSTETRICS & GYNECOLOGY

## 2022-05-23 PROCEDURE — 83036 HEMOGLOBIN GLYCOSYLATED A1C: CPT | Mod: QW,,, | Performed by: OBSTETRICS & GYNECOLOGY

## 2022-05-23 RX ORDER — NAPROXEN SODIUM 220 MG/1
81 TABLET, FILM COATED ORAL DAILY
Qty: 90 TABLET | Refills: 1 | Status: SHIPPED | OUTPATIENT
Start: 2022-05-23 | End: 2022-05-26

## 2022-05-23 RX ORDER — LISINOPRIL 10 MG/1
10 TABLET ORAL DAILY
Qty: 90 TABLET | Refills: 1 | Status: SHIPPED | OUTPATIENT
Start: 2022-05-23 | End: 2022-12-06

## 2022-05-23 RX ORDER — ATORVASTATIN CALCIUM 80 MG/1
TABLET, FILM COATED ORAL
Qty: 90 TABLET | Refills: 1 | Status: SHIPPED | OUTPATIENT
Start: 2022-05-23

## 2022-05-23 RX ORDER — SITAGLIPTIN 50 MG/1
50 TABLET, FILM COATED ORAL DAILY
Qty: 90 TABLET | Refills: 1 | Status: SHIPPED | OUTPATIENT
Start: 2022-05-23

## 2022-05-23 RX ORDER — GABAPENTIN 800 MG/1
800 TABLET ORAL 3 TIMES DAILY
Qty: 90 TABLET | Refills: 3 | Status: SHIPPED | OUTPATIENT
Start: 2022-05-23

## 2022-05-23 RX ORDER — LISINOPRIL 10 MG/1
10 TABLET ORAL DAILY
Qty: 90 TABLET | Refills: 1 | Status: SHIPPED | OUTPATIENT
Start: 2022-05-23 | End: 2022-05-23

## 2022-05-23 RX ORDER — CETIRIZINE HYDROCHLORIDE 10 MG/1
10 TABLET ORAL DAILY
Qty: 30 TABLET | Refills: 1 | Status: SHIPPED | OUTPATIENT
Start: 2022-05-23 | End: 2022-05-23

## 2022-05-23 RX ORDER — DICLOFENAC SODIUM 10 MG/G
2 GEL TOPICAL 4 TIMES DAILY
Qty: 50 G | Refills: 6 | Status: SHIPPED | OUTPATIENT
Start: 2022-05-23

## 2022-05-23 RX ORDER — CETIRIZINE HYDROCHLORIDE 10 MG/1
10 TABLET ORAL DAILY
Qty: 90 TABLET | Refills: 1 | Status: SHIPPED | OUTPATIENT
Start: 2022-05-23

## 2022-05-23 RX ORDER — INSULIN DETEMIR 100 [IU]/ML
45 INJECTION, SOLUTION SUBCUTANEOUS NIGHTLY
Qty: 13.5 ML | Refills: 3 | Status: SHIPPED | OUTPATIENT
Start: 2022-05-23 | End: 2022-06-22

## 2022-05-23 NOTE — PROGRESS NOTES
Subjective:   Patient ID: Tk Padron is a 57 y.o. male who presents for evaluation today.    Chief Complaint:  Follow-up (Patient needs refills on medication )    History of Present Illness  HPI   Health maintenance  Due for colonoscopy     Hypertension  Patient is here for follow-up of hypertension.   He is exercising and is adherent to a low-salt diet. Home blood pressure readings: not doing.    Diet type, salt intake: low sodium   Use of agents associated with hypertension: none  (anticholinergics, amphetamines, anorectics, corticosteroids, decongestants, estrogens or OCP, NSAIDs, thyroid hormones, topical mineralocorticoids)  Associated signs and symptoms: none.   Patient specifically denies: blurred vision, chest pain, dyspnea, headache, neck aches, orthopnea, palpitations, paroxysmal nocturnal dyspnea, peripheral edema, pulsating in the ears and tiredness/fatigue.   Medication compliance: taking as prescribed.  History of target organ damage: chronic kidney disease.  Cardiovascular risk factors are advanced age (older than 55 for men, 65 for women), diabetes mellitus, dyslipidemia, hypertension, male gender and obesity (BMI >= 30 kg/m2).   Family history is negative for hypertension, diabetes, or cardiovascular disease.     Diabetes Mellitus  Patient presents for follow up of diabetes.   Current sx: asymptomatic  Patient denies: foot ulcerations, hyperglycemia, hypoglycemia , increased appetite, nausea, paresthesia of the feet, polydipsia, polyuria, visual disturbances, vomiting and weight loss.  Glucose monitoring: checks daily  Medication compliance: has been compliant with the medicaiton regimen  Dietary compliance: Self-reports he is compliant most of the time with following an ADA diet.  Exercise compliance: Rates his exercise level as intermittently.    Problem-focused Diabetes health goals:     Screening / Status    Goal    Meeting Goal?     HgbA1C  8.9 (07/21/2021) Annually  Less than 8% Yes      Lipid Profile Last done:  : 07/26/2021 Annually Yes   Lab Results   Component Value Date    LDLCALC 37.8 07/26/2021    Annually   Less than 100 mg/dl Yes    Nephropathy Screening  No results found for: LABMICR  No results found for: PROTEINUA Annually No    Blood Pressure Control      BP: 116/74 (05/23/22 0927) Less than 140/90 Yes    Dilated retinal exam  Date of last exam:       Most Recent Eye Exam Date: Not Found Annually Yes    Statin: Taking  Yes   ACE/ARB: Taking  Yes    Foot exam  Date of last exam:       : 09/16/2021 Annually Yes   No vision changes, polyuria, polydipsia, hyper/hypoglycemic episodes, SOB, chest pain.    FAMILY HISTORY  History reviewed. No pertinent family history.  SOCIAL HISTORY  Social History     Tobacco Use   Smoking Status Never Smoker   Smokeless Tobacco Never Used   ,   Social History     Substance and Sexual Activity   Alcohol Use Never     MEDICATIONS  Outpatient Medications Marked as Taking for the 5/23/22 encounter (Office Visit) with Augustina Johansen NP   Medication Sig Dispense Refill    [DISCONTINUED] aspirin 81 MG Chew Take 1 tablet (81 mg total) by mouth once daily. 90 tablet 1    [DISCONTINUED] atorvastatin (LIPITOR) 80 MG tablet TAKE 1 TABLET(80 MG) BY MOUTH EVERY NIGHT 90 tablet 1    [DISCONTINUED] cetirizine (ZYRTEC) 10 MG tablet Take 1 tablet (10 mg total) by mouth once daily. 30 tablet 1    [DISCONTINUED] diclofenac sodium (ARTHRITIS PAIN, DICLOFENAC,) 1 % Gel Apply 2 g topically 4 (four) times daily. 50 g 6    [DISCONTINUED] gabapentin (NEURONTIN) 800 MG tablet TAKE 1 TABLET(800 MG) BY MOUTH THREE TIMES DAILY 90 tablet 3    [DISCONTINUED] JANUVIA 50 mg Tab TAKE 1 TABLET(50 MG) BY MOUTH EVERY DAY 90 tablet 1    [DISCONTINUED] lisinopriL 10 MG tablet Take 1 tablet (10 mg total) by mouth once daily. 30 tablet 3     Review of Systems   Review of Systems   Constitutional: Negative for activity change, appetite change, fever and unexpected weight change.   HENT:  "Negative for dental problem, hearing loss, sneezing, sore throat, trouble swallowing and voice change.    Eyes: Negative for visual disturbance.   Respiratory: Negative for cough, choking, chest tightness and shortness of breath.    Cardiovascular: Negative for chest pain, palpitations, leg swelling and claudication.   Gastrointestinal: Negative for abdominal pain, blood in stool, change in bowel habit, nausea, vomiting, fecal incontinence and change in bowel habit.   Endocrine: Negative for polydipsia, polyphagia and polyuria.   Genitourinary: Negative for decreased urine volume, dysuria and hematuria.   Musculoskeletal: Negative for gait problem, neck pain and neck stiffness.   Integumentary:  Negative for color change, pallor, rash, wound and mole/lesion.   Allergic/Immunologic: Negative for frequent infections.   Neurological: Negative for dizziness, vertigo, seizures, syncope, speech difficulty, disturbances in coordination and coordination difficulties.   Hematological: Negative for adenopathy. Does not bruise/bleed easily.   Psychiatric/Behavioral: Negative for behavioral problems, confusion, hallucinations, self-injury, sleep disturbance and suicidal ideas.         Objective:    VITALS  BP Readings from Last 1 Encounters:   05/23/22 116/74   Weight: 96.6 kg (213 lb)   Height: 5' 9" (175.3 cm)   BMI Readings from Last 1 Encounters:   05/23/22 31.45 kg/m²       Physical Exam  Vitals and nursing note reviewed.   Constitutional:       General: He is not in acute distress.     Appearance: Normal appearance. He is not ill-appearing, toxic-appearing or diaphoretic.   HENT:      Head: Normocephalic and atraumatic.      Right Ear: External ear normal.      Left Ear: External ear normal.      Nose: Nose normal.   Eyes:      General:         Right eye: No discharge.         Left eye: No discharge.   Neck:      Vascular: No carotid bruit.   Cardiovascular:      Rate and Rhythm: Normal rate and regular rhythm.      Heart " sounds: Normal heart sounds. No murmur heard.    No friction rub. No gallop.   Pulmonary:      Effort: Pulmonary effort is normal. No respiratory distress.      Breath sounds: Normal breath sounds. No stridor. No wheezing, rhonchi or rales.   Chest:      Chest wall: No tenderness.   Musculoskeletal:         General: Normal range of motion.      Cervical back: Normal range of motion and neck supple.      Left lower leg: No edema.   Skin:     General: Skin is warm and dry.      Coloration: Skin is not jaundiced or pale.   Neurological:      General: No focal deficit present.      Mental Status: He is alert and oriented to person, place, and time.      Gait: Gait abnormal.   Psychiatric:         Mood and Affect: Mood normal.         Behavior: Behavior normal.         Thought Content: Thought content normal.         Judgment: Judgment normal.         Assessment:      1. Type 2 diabetes mellitus with diabetic mononeuropathy, with long-term current use of insulin    2. Seasonal allergic rhinitis, unspecified trigger    3. Essential hypertension    4. Colon cancer screening    5. Osteoarthritis of multiple joints, unspecified osteoarthritis type    6. Hyperlipidemia, unspecified hyperlipidemia type       Plan:   Type 2 diabetes mellitus with diabetic mononeuropathy, with long-term current use of insulin  -     aspirin 81 MG Chew; Take 1 tablet (81 mg total) by mouth once daily.  Dispense: 90 tablet; Refill: 1  -     gabapentin (NEURONTIN) 800 MG tablet; Take 1 tablet (800 mg total) by mouth 3 (three) times daily.  Dispense: 90 tablet; Refill: 3  -     LEVEMIR FLEXTOUCH U-100 INSULN 100 unit/mL (3 mL) InPn pen; Inject 45 Units into the skin every evening.  Dispense: 13.5 mL; Refill: 3  -     lisinopriL 10 MG tablet; Take 1 tablet (10 mg total) by mouth once daily.  Dispense: 90 tablet; Refill: 1  -     JANUVIA 50 mg Tab; Take 1 tablet (50 mg total) by mouth once daily.  Dispense: 90 tablet; Refill: 1  -     Hemoglobin  A1C  -     Ambulatory referral/consult to Optometry; Future; Expected date: 05/30/2022  -     Microalbumin/creatinine urine ratio  -     CBC Auto Differential  -     Comprehensive Metabolic Panel  -     Lipid Panel  -     TSH  -     Hemoglobin A1C  1.  Rx changes: none  2.  Education: Reviewed ABCs of diabetes management (respective goals in parentheses):  A1C (<7), blood pressure (<130/80), and cholesterol (LDL <100).  3.  Compliance at present is estimated to be excellent. Efforts to improve compliance (if necessary) will be directed at increased exercise.  4. Follow up: 3 months    Pt states they are familiar with the signs and symptoms of hypoglycemia and how to treat.       Seasonal allergic rhinitis, unspecified trigger  -     cetirizine (ZYRTEC) 10 MG tablet; Take 1 tablet (10 mg total) by mouth once daily.  Dispense: 30 tablet; Refill: 1    Essential hypertension  -     lisinopriL 10 MG tablet; Take 1 tablet (10 mg total) by mouth once daily.  Dispense: 90 tablet; Refill: 1  -     CBC Auto Differential  -     Comprehensive Metabolic Panel  -     Lipid Panel  -     TSH  Advised exercise and low salt diet high in vegetables, whole grains and low in meat/saturated fats. Reviewed importance of taking medication(s) daily. Side effects of medications reviewed. Monitor BP daily at home. Keep a log of values & bring to next appointment. Report any abnormalities to provider. Report to nearest ER with chest pain, shortness of breath, markedly increased BP, dyspnea, headache, visual disturbance, etc.      Colon cancer screening  -     Ambulatory referral/consult to General Surgery; Future; Expected date: 05/30/2022  No history of colon cancer screening to date. Patient denies any family history of colon cancer. Discussed colon cancer is usually asymptomatic until far progressed making screening essential. Patient verbalizes understanding and agrees to screening. Referral faxed today. Patient instructed to contact our  office if a call has not received from the facility to schedule an appointment within the next 2 weeks.      Osteoarthritis of multiple joints, unspecified osteoarthritis type  -     diclofenac sodium (ARTHRITIS PAIN, DICLOFENAC,) 1 % Gel; Apply 2 g topically 4 (four) times daily.  Dispense: 50 g; Refill: 6  Medication regimen, side effects & necessary monitoring discussed with patient.      Hyperlipidemia, unspecified hyperlipidemia type  -     atorvastatin (LIPITOR) 80 MG tablet; TAKE 1 TABLET(80 MG) BY MOUTH EVERY NIGHT  Dispense: 90 tablet; Refill: 1  Risks of elevated cholesterol include plaque formation, HTN, increased risk for stroke and MI. Moderate intensity exercise for 150 minutes per week increases HDL, lowers total cholesterol, and helps control weight. Plant-based diets and the Mediterranean diet, which are high in legumes, fruits, vegetables, nuts, fish, and olive oil reduce the risk of CV disease. Omega-3 fatty acids and fish oil intake decrease triglyceride and LDL level, while increasing HDL - overall CV benefit and mortality reduction remains uncertain.      Patient instructed to contact the clinic should any questions or concerns arise prior to next office visit. Risks, benefits, and alternatives discussed with patient. Patient verbalized understanding of discussed plan of care. Asked patient if any further questions, answered no. Follow up as discussed or sooner PRN.

## 2022-05-23 NOTE — TELEPHONE ENCOUNTER
----- Message from Augustina Johansen NP sent at 5/23/2022 12:06 PM CDT -----  Please inform patient that overall labs look good.  Instruct him to keep his follow ups with nephrology.

## 2022-05-24 ENCOUNTER — PATIENT OUTREACH (OUTPATIENT)
Dept: ADMINISTRATIVE | Facility: HOSPITAL | Age: 57
End: 2022-05-24
Payer: MEDICARE

## 2022-05-24 NOTE — PROGRESS NOTES
Pt has been referred for a consult to have a colonoscopy  Working gap report for DM eye exams. LMTRC. No eye exam results found and according to PCP documentation last eye exam was in 2018.